# Patient Record
Sex: FEMALE | Race: WHITE | NOT HISPANIC OR LATINO | Employment: FULL TIME | URBAN - METROPOLITAN AREA
[De-identification: names, ages, dates, MRNs, and addresses within clinical notes are randomized per-mention and may not be internally consistent; named-entity substitution may affect disease eponyms.]

---

## 2023-10-16 ENCOUNTER — HOSPITAL ENCOUNTER (EMERGENCY)
Facility: CLINIC | Age: 28
Discharge: HOME | End: 2023-10-16

## 2023-11-15 ENCOUNTER — APPOINTMENT (OUTPATIENT)
Dept: CT IMAGING | Facility: CLINIC | Age: 28
End: 2023-11-15
Attending: EMERGENCY MEDICINE
Payer: COMMERCIAL

## 2023-11-15 ENCOUNTER — HOSPITAL ENCOUNTER (EMERGENCY)
Facility: CLINIC | Age: 28
Discharge: HOME | End: 2023-11-15
Attending: EMERGENCY MEDICINE | Admitting: EMERGENCY MEDICINE
Payer: COMMERCIAL

## 2023-11-15 VITALS
RESPIRATION RATE: 14 BRPM | DIASTOLIC BLOOD PRESSURE: 71 MMHG | BODY MASS INDEX: 39.99 KG/M2 | WEIGHT: 270 LBS | HEART RATE: 83 BPM | TEMPERATURE: 97.6 F | OXYGEN SATURATION: 99 % | SYSTOLIC BLOOD PRESSURE: 124 MMHG | HEIGHT: 69 IN

## 2023-11-15 DIAGNOSIS — M79.10 MYALGIA: ICD-10-CM

## 2023-11-15 DIAGNOSIS — R50.9 FEBRILE ILLNESS: ICD-10-CM

## 2023-11-15 DIAGNOSIS — R51.9 ACUTE INTRACTABLE HEADACHE, UNSPECIFIED HEADACHE TYPE: ICD-10-CM

## 2023-11-15 LAB
ALBUMIN SERPL BCG-MCNC: 4.2 G/DL (ref 3.5–5.2)
ALBUMIN UR-MCNC: 70 MG/DL
ALP SERPL-CCNC: 59 U/L (ref 40–150)
ALT SERPL W P-5'-P-CCNC: 17 U/L (ref 0–50)
ANION GAP SERPL CALCULATED.3IONS-SCNC: 15 MMOL/L (ref 7–15)
APPEARANCE CSF: CLEAR
APPEARANCE UR: CLEAR
AST SERPL W P-5'-P-CCNC: 32 U/L (ref 0–45)
BACTERIA #/AREA URNS HPF: ABNORMAL /HPF
BASOPHILS # BLD AUTO: 0 10E3/UL (ref 0–0.2)
BASOPHILS NFR BLD AUTO: 0 %
BILIRUB SERPL-MCNC: 0.3 MG/DL
BILIRUB UR QL STRIP: NEGATIVE
BUN SERPL-MCNC: 8.5 MG/DL (ref 6–20)
CALCIUM SERPL-MCNC: 8.7 MG/DL (ref 8.6–10)
CHLORIDE SERPL-SCNC: 101 MMOL/L (ref 98–107)
CK SERPL-CCNC: 60 U/L (ref 26–192)
COLOR CSF: COLORLESS
COLOR UR AUTO: YELLOW
CREAT SERPL-MCNC: 0.9 MG/DL (ref 0.51–0.95)
DEPRECATED HCO3 PLAS-SCNC: 19 MMOL/L (ref 22–29)
EGFRCR SERPLBLD CKD-EPI 2021: 88 ML/MIN/1.73M2
EOSINOPHIL # BLD AUTO: 0.2 10E3/UL (ref 0–0.7)
EOSINOPHIL NFR BLD AUTO: 6 %
ERYTHROCYTE [DISTWIDTH] IN BLOOD BY AUTOMATED COUNT: 14.6 % (ref 10–15)
FLUAV RNA SPEC QL NAA+PROBE: NEGATIVE
FLUBV RNA RESP QL NAA+PROBE: NEGATIVE
GLUCOSE CSF-MCNC: 60 MG/DL (ref 40–70)
GLUCOSE SERPL-MCNC: 90 MG/DL (ref 70–99)
GLUCOSE UR STRIP-MCNC: NEGATIVE MG/DL
GROUP A STREP BY PCR: NOT DETECTED
HCG SERPL QL: NEGATIVE
HCT VFR BLD AUTO: 42.3 % (ref 35–47)
HGB BLD-MCNC: 13.6 G/DL (ref 11.7–15.7)
HGB UR QL STRIP: NEGATIVE
IMM GRANULOCYTES # BLD: 0.1 10E3/UL
IMM GRANULOCYTES NFR BLD: 2 %
KETONES UR STRIP-MCNC: NEGATIVE MG/DL
LACTATE SERPL-SCNC: 0.8 MMOL/L (ref 0.7–2)
LEUKOCYTE ESTERASE UR QL STRIP: NEGATIVE
LYMPHOCYTES # BLD AUTO: 1.2 10E3/UL (ref 0.8–5.3)
LYMPHOCYTES NFR BLD AUTO: 36 %
MCH RBC QN AUTO: 25.6 PG (ref 26.5–33)
MCHC RBC AUTO-ENTMCNC: 32.2 G/DL (ref 31.5–36.5)
MCV RBC AUTO: 80 FL (ref 78–100)
MONOCYTES # BLD AUTO: 0.2 10E3/UL (ref 0–1.3)
MONOCYTES NFR BLD AUTO: 5 %
MUCOUS THREADS #/AREA URNS LPF: PRESENT /LPF
NEUTROPHILS # BLD AUTO: 1.6 10E3/UL (ref 1.6–8.3)
NEUTROPHILS NFR BLD AUTO: 51 %
NITRATE UR QL: NEGATIVE
NRBC # BLD AUTO: 0 10E3/UL
NRBC BLD AUTO-RTO: 0 /100
PH UR STRIP: 6 [PH] (ref 5–7)
PLATELET # BLD AUTO: 173 10E3/UL (ref 150–450)
POTASSIUM SERPL-SCNC: 3.9 MMOL/L (ref 3.4–5.3)
PROCALCITONIN SERPL IA-MCNC: 0.11 NG/ML
PROT CSF-MCNC: 24.9 MG/DL (ref 15–45)
PROT SERPL-MCNC: 7.5 G/DL (ref 6.4–8.3)
RBC # BLD AUTO: 5.31 10E6/UL (ref 3.8–5.2)
RBC # CSF MANUAL: 1 /UL (ref 0–2)
RBC URINE: 3 /HPF
RSV RNA SPEC NAA+PROBE: NEGATIVE
SARS-COV-2 RNA RESP QL NAA+PROBE: NEGATIVE
SODIUM SERPL-SCNC: 135 MMOL/L (ref 135–145)
SP GR UR STRIP: 1.05 (ref 1–1.03)
SQUAMOUS EPITHELIAL: 9 /HPF
TRANSITIONAL EPI: 1 /HPF
TUBE # CSF: 4
UROBILINOGEN UR STRIP-MCNC: NORMAL MG/DL
WBC # BLD AUTO: 3.2 10E3/UL (ref 4–11)
WBC # CSF MANUAL: 0 /UL (ref 0–5)
WBC URINE: 2 /HPF

## 2023-11-15 PROCEDURE — 250N000013 HC RX MED GY IP 250 OP 250 PS 637: Performed by: EMERGENCY MEDICINE

## 2023-11-15 PROCEDURE — 84703 CHORIONIC GONADOTROPIN ASSAY: CPT | Performed by: EMERGENCY MEDICINE

## 2023-11-15 PROCEDURE — 96374 THER/PROPH/DIAG INJ IV PUSH: CPT | Mod: 59

## 2023-11-15 PROCEDURE — 96361 HYDRATE IV INFUSION ADD-ON: CPT

## 2023-11-15 PROCEDURE — 85014 HEMATOCRIT: CPT | Performed by: EMERGENCY MEDICINE

## 2023-11-15 PROCEDURE — 62270 DX LMBR SPI PNXR: CPT

## 2023-11-15 PROCEDURE — 84145 PROCALCITONIN (PCT): CPT | Performed by: EMERGENCY MEDICINE

## 2023-11-15 PROCEDURE — 70450 CT HEAD/BRAIN W/O DYE: CPT

## 2023-11-15 PROCEDURE — 87015 SPECIMEN INFECT AGNT CONCNTJ: CPT | Performed by: EMERGENCY MEDICINE

## 2023-11-15 PROCEDURE — 82945 GLUCOSE OTHER FLUID: CPT | Performed by: EMERGENCY MEDICINE

## 2023-11-15 PROCEDURE — 250N000013 HC RX MED GY IP 250 OP 250 PS 637: Performed by: SOCIAL WORKER

## 2023-11-15 PROCEDURE — 87637 SARSCOV2&INF A&B&RSV AMP PRB: CPT | Performed by: EMERGENCY MEDICINE

## 2023-11-15 PROCEDURE — 89050 BODY FLUID CELL COUNT: CPT | Performed by: EMERGENCY MEDICINE

## 2023-11-15 PROCEDURE — 36415 COLL VENOUS BLD VENIPUNCTURE: CPT | Performed by: EMERGENCY MEDICINE

## 2023-11-15 PROCEDURE — 99285 EMERGENCY DEPT VISIT HI MDM: CPT | Mod: 25

## 2023-11-15 PROCEDURE — 87651 STREP A DNA AMP PROBE: CPT | Performed by: EMERGENCY MEDICINE

## 2023-11-15 PROCEDURE — 84157 ASSAY OF PROTEIN OTHER: CPT | Performed by: EMERGENCY MEDICINE

## 2023-11-15 PROCEDURE — 80053 COMPREHEN METABOLIC PANEL: CPT | Performed by: EMERGENCY MEDICINE

## 2023-11-15 PROCEDURE — 81003 URINALYSIS AUTO W/O SCOPE: CPT | Performed by: EMERGENCY MEDICINE

## 2023-11-15 PROCEDURE — 250N000011 HC RX IP 250 OP 636: Performed by: EMERGENCY MEDICINE

## 2023-11-15 PROCEDURE — 82550 ASSAY OF CK (CPK): CPT | Performed by: EMERGENCY MEDICINE

## 2023-11-15 PROCEDURE — 87637 SARSCOV2&INF A&B&RSV AMP PRB: CPT | Performed by: SOCIAL WORKER

## 2023-11-15 PROCEDURE — 258N000003 HC RX IP 258 OP 636: Performed by: EMERGENCY MEDICINE

## 2023-11-15 PROCEDURE — 83605 ASSAY OF LACTIC ACID: CPT | Performed by: EMERGENCY MEDICINE

## 2023-11-15 PROCEDURE — 87205 SMEAR GRAM STAIN: CPT | Performed by: EMERGENCY MEDICINE

## 2023-11-15 RX ORDER — MAGNESIUM HYDROXIDE/ALUMINUM HYDROXICE/SIMETHICONE 120; 1200; 1200 MG/30ML; MG/30ML; MG/30ML
15 SUSPENSION ORAL ONCE
Status: COMPLETED | OUTPATIENT
Start: 2023-11-15 | End: 2023-11-15

## 2023-11-15 RX ORDER — ACETAMINOPHEN 325 MG/1
975 TABLET ORAL ONCE
Status: COMPLETED | OUTPATIENT
Start: 2023-11-15 | End: 2023-11-15

## 2023-11-15 RX ORDER — MORPHINE SULFATE 4 MG/ML
4 INJECTION, SOLUTION INTRAMUSCULAR; INTRAVENOUS
Status: DISCONTINUED | OUTPATIENT
Start: 2023-11-15 | End: 2023-11-15 | Stop reason: HOSPADM

## 2023-11-15 RX ORDER — OXYCODONE HYDROCHLORIDE 5 MG/1
5 TABLET ORAL EVERY 6 HOURS PRN
Qty: 6 TABLET | Refills: 0 | Status: SHIPPED | OUTPATIENT
Start: 2023-11-15 | End: 2023-11-18

## 2023-11-15 RX ORDER — IBUPROFEN 600 MG/1
600 TABLET, FILM COATED ORAL ONCE
Status: COMPLETED | OUTPATIENT
Start: 2023-11-15 | End: 2023-11-15

## 2023-11-15 RX ORDER — PROMETHAZINE HYDROCHLORIDE 25 MG/1
25 TABLET ORAL EVERY 6 HOURS PRN
Qty: 12 TABLET | Refills: 0 | Status: SHIPPED | OUTPATIENT
Start: 2023-11-15 | End: 2024-09-24

## 2023-11-15 RX ADMIN — ALUMINUM HYDROXIDE, MAGNESIUM HYDROXIDE, AND SIMETHICONE 15 ML: 200; 200; 20 SUSPENSION ORAL at 21:14

## 2023-11-15 RX ADMIN — SODIUM CHLORIDE 1000 ML: 9 INJECTION, SOLUTION INTRAVENOUS at 15:42

## 2023-11-15 RX ADMIN — ACETAMINOPHEN 975 MG: 325 TABLET, FILM COATED ORAL at 10:40

## 2023-11-15 RX ADMIN — IBUPROFEN 600 MG: 600 TABLET ORAL at 10:40

## 2023-11-15 RX ADMIN — MORPHINE SULFATE 4 MG: 4 INJECTION, SOLUTION INTRAMUSCULAR; INTRAVENOUS at 18:25

## 2023-11-15 ASSESSMENT — ACTIVITIES OF DAILY LIVING (ADL)
ADLS_ACUITY_SCORE: 35

## 2023-11-15 NOTE — ED PROVIDER NOTES
"  History     Chief Complaint:  Flu Symptoms       The history is provided by the patient.      Bandar Bhatt is a 28 year old female who presents with with concern for headache and meningitis.  The patient reports that she has had a fever, severe headache, neck, back and flank pain that has been getting increasingly worse over the past week.  No rash.  She does not have a sore throat.  Her boyfriend is daughter tested positive for influenza B last week but she has not been around her.  No other sick contacts.  No recent travel. No history of IV drug use. She was seen by a virtual doctor this morning and told that he was concerned she had meningitis and referred to the emergency department. Patient reports she recently moved here from [place].    Independent Historian:   None - Patient Only    Review of External Notes:   None    Medications:    No current medications on file.    Past Medical History:    No past medical history on file.    Physical Exam   Patient Vitals for the past 24 hrs:   BP Temp Temp src Pulse Resp SpO2 Height Weight   11/15/23 1935 124/71 -- -- 83 14 99 % -- --   11/15/23 1850 110/59 -- -- 78 -- 99 % -- --   11/15/23 1827 -- -- -- -- -- 97 % -- --   11/15/23 1503 119/66 97.6  F (36.4  C) Temporal 86 18 99 % -- --   11/15/23 1035 135/70 (!) 100.7  F (38.2  C) Oral 107 18 99 % 1.753 m (5' 9\") 122.5 kg (270 lb)     Physical Exam  General: Well-nourished, appears to be in pain  Eyes: PERRL, conjunctivae pink no scleral icterus or conjunctival injection  ENT:  Moist mucus membranes, posterior oropharynx clear without erythema or exudates  Respiratory:  Lungs clear to auscultation bilaterally, no crackles/rubs/wheezes.  Good air movement  CV: Normal rate and rhythm, no murmurs/rubs/gallops  GI:  Abdomen soft and non-distended.  Normoactive BS.  No tenderness, guarding or rebound  Skin: Warm, dry.  No rashes or petechiae  Musculoskeletal: No peripheral edema or calf tenderness  Neuro: Alert and " oriented to person/place/time. Neck seems somewhat stiff. PERRL, EOMI no nystagmus, no aphasia/facial droop/dysarthria, tongue midline, symmetric palatal elevation, normal strength at SCM/trapezius/BUE/BLE, normal coordination to FNF at BUE, normal casual gait, negative romberg, sensation intact to LT over face/BUE/BLE  Psychiatric: Normal affect    Emergency Department Course   Imaging:  CT Head w/o Contrast   Final Result   IMPRESSION:  Normal head CT.      Radiation dose for this scan was reduced using automated exposure   control, adjustment of the mA and/or kV according to patient size, or   iterative reconstruction technique.        KENY PAUL MD            SYSTEM ID:  [id number]        Laboratory:  Labs Ordered and Resulted from Time of ED Arrival to Time of ED Departure   COMPREHENSIVE METABOLIC PANEL - Abnormal       Result Value    Sodium 135      Potassium 3.9      Carbon Dioxide (CO2) 19 (*)     Anion Gap 15      Urea Nitrogen 8.5      Creatinine 0.90      GFR Estimate 88      Calcium 8.7      Chloride 101      Glucose 90      Alkaline Phosphatase 59      AST 32      ALT 17      Protein Total 7.5      Albumin 4.2      Bilirubin Total 0.3     ROUTINE UA WITH MICROSCOPIC REFLEX TO CULTURE - Abnormal    Color Urine Yellow      Appearance Urine Clear      Glucose Urine Negative      Bilirubin Urine Negative      Ketones Urine Negative      Specific Gravity Urine 1.049 (*)     Blood Urine Negative      pH Urine 6.0      Protein Albumin Urine 70 (*)     Urobilinogen Urine Normal      Nitrite Urine Negative      Leukocyte Esterase Urine Negative      Bacteria Urine Few (*)     Mucus Urine Present (*)     RBC Urine 3 (*)     WBC Urine 2      Squamous Epithelials Urine 9 (*)     Transitional Epithelials Urine 1     CBC WITH PLATELETS AND DIFFERENTIAL - Abnormal    WBC Count 3.2 (*)     RBC Count 5.31 (*)     Hemoglobin 13.6      Hematocrit 42.3      MCV 80      MCH 25.6 (*)     MCHC 32.2      RDW 14.6       Platelet Count 173      % Neutrophils 51      % Lymphocytes 36      % Monocytes 5      % Eosinophils 6      % Basophils 0      % Immature Granulocytes 2      NRBCs per 100 WBC 0      Absolute Neutrophils 1.6      Absolute Lymphocytes 1.2      Absolute Monocytes 0.2      Absolute Eosinophils 0.2      Absolute Basophils 0.0      Absolute Immature Granulocytes 0.1      Absolute NRBCs 0.0     INFLUENZA A/B, RSV, & SARS-COV2 PCR - Normal    Influenza A PCR Negative      Influenza B PCR Negative      RSV PCR Negative      SARS CoV2 PCR Negative     LACTIC ACID WHOLE BLOOD - Normal    Lactic Acid 0.8     PROCALCITONIN - Normal    Procalcitonin 0.11     HCG QUALITATIVE PREGNANCY - Normal    hCG Serum Qualitative Negative     CK TOTAL - Normal    CK 60     GLUCOSE CSF - Normal    Glucose CSF 60     PROTEIN TOTAL CSF - Normal    Protein total CSF 24.9     GROUP A STREPTOCOCCUS PCR THROAT SWAB - Normal    Group A strep by PCR Not Detected     CELL COUNT CSF    Tube Number 4      Color Colorless      Clarity Clear      Total Nucleated Cells 0      RBC Count 1     CELL COUNT WITH DIFFERENTIAL CSF     Emergency Department Course & Assessments:     Interventions:  Medications   acetaminophen (TYLENOL) tablet 975 mg (975 mg Oral $Given 11/15/23 1040)   ibuprofen (ADVIL/MOTRIN) tablet 600 mg (600 mg Oral $Given 11/15/23 1040)   sodium chloride 0.9% BOLUS 1,000 mL (0 mLs Intravenous Stopped 11/15/23 1938)   famotidine (PEPCID) injection 20 mg (20 mg Intravenous Not Given 11/15/23 1939)   alum & mag hydroxide-simethicone (MAALOX) suspension 15 mL (15 mLs Oral Not Given 11/15/23 1938)   alum & mag hydroxide-simethicone (MAALOX) suspension 15 mL (15 mLs Oral $Given 11/15/23 2114)     Procedures:    Lumbar Puncture      Procedure: Lumbar Puncture      Indication: headache and fever     Consent: Written from Patient  Risks Discussed (including but not limited to): Infection, Bleeding, Spinal headache with possibility of spinal patch, and  Temporary or permanent neurological injury     Universal Protocol: Universal protocol was followed and time out conducted just prior to starting procedure, confirming patient identity, site/side, procedure, patient position, and availability of correct equipment and implants.     Anesthesia/Sedation: Lidocaine - 1%     Procedure Note:     Patient was placed in a sitting position.  The skin overlying the L3-4 area was prepped with povidone-iodine.    The patient was medicated as above.   A 21 gauge spinal needle was used to gain access to the subarachnoid space with stylet in place.   Opening pressure was not measured.   The fluid was initially bloody but clearing.   Stylet was replaced and needle withdrawn.      Patient Status:  The patient tolerated the procedure well: Yes. There were no complications.    Independent Interpretation (X-rays, CTs, rhythm strip):  I reviewed and interpreted the head CT and see no evidence of intracranial hemorrhage.    Consultations/Discussion of Management or Tests:    ED Course as of 11/16/23 0034   Wed Nov 15, 2023   1718 I rechecked the patient and explained findings.         Social Determinants of Health affecting care:   None    Disposition:  The patient was discharged to home.     Impression & Plan    CMS Diagnoses: None    Medical Decision Making:  Bandar Bhatt is a 28 year old female who comes with headache, fever, myalgias and some concerning neck stiffness on exam. She is fairly well appearing. No rash to suggest bacterial meningitis. Viral testing for COVID, influenza and RSV were negative. Strep testing is negative. She does not have abdominal pain to suggest an intrabdominal source. Lungs are clear and she does not have significant respiratory symptoms. Labs were otherwise reassuring, however, given her fever, headache and neck symptoms, lumbar puncture was undertaken for concern of meningitis. Fortunately, LP shows no signs of meningitis. Etiology of fever and  symptoms are unknown but likely viral syndrome. Patient will be discharged with pain meds and antiemetics. A referral for a PMD was made. No indication for antibiotics at this time. Patient felt comfortable with the plan for discharge home.      Diagnosis:    ICD-10-CM    1. Febrile illness  R50.9 Primary Care Referral      2. Acute intractable headache, unspecified headache type  R51.9 Primary Care Referral      3. Myalgia  M79.10 Primary Care Referral           Discharge Medications:  Discharge Medication List as of 11/15/2023  9:01 PM        START taking these medications    Details   oxyCODONE (ROXICODONE) 5 MG tablet Take 1 tablet (5 mg) by mouth every 6 hours as needed for severe pain, Disp-6 tablet, R-0, E-Prescribe      promethazine (PHENERGAN) 25 MG tablet Take 1 tablet (25 mg) by mouth every 6 hours as needed for nausea, Disp-12 tablet, R-0, E-Prescribe           Christine LAINEZ. MD Juan  11/15/2023   Christine Martinez MD Cho, Amy C, MD  11/16/23 0038

## 2023-11-15 NOTE — ED TRIAGE NOTES
Patient presents with complaints of headache, generalized body aches, and nausea for the the past week. Patient was told that she needed to be tested for meningitis.      Triage Assessment (Adult)       Row Name 11/15/23 1034          Triage Assessment    Airway WDL WDL        Respiratory WDL    Respiratory WDL WDL        Skin Circulation/Temperature WDL    Skin Circulation/Temperature WDL WDL        Cardiac WDL    Cardiac WDL WDL        Peripheral/Neurovascular WDL    Peripheral Neurovascular WDL WDL        Cognitive/Neuro/Behavioral WDL    Cognitive/Neuro/Behavioral WDL WDL

## 2023-11-15 NOTE — ED NOTES
PIT/Triage Evaluation    Patient presented with concern for headache and meningitis.  The patient reports that she has had a fever, severe headache, neck, back and flank pain that has been getting increasingly worse over the past week.  No rash.  She does not have a sore throat.  Her boyfriend is daughter tested positive for influenza B last week but she has not been around her.  No other sick contacts.  No recent travel.  She was seen by a virtual doctor this morning and told that he was concerned she had meningitis and referred to the emergency department.    Exam is notable for:  Mildly stiff neck. Otherwise well appearing.     Appropriate interventions for symptom management were initiated if applicable.  Appropriate diagnostic tests were initiated if indicated.    Important information for subsequent clinician:  Negative viral testing for influenza, RSV and COVID.  Discussed her examination and that lumbar puncture is only way to rule out meningitis.  I think she is likely low risk for this but exam is concerning as well as history.  Ordered laboratory studies and head CT as well as IV fluids.  She was willing to stay.    I briefly evaluated the patient and developed an initial plan of care. I discussed this plan and explained that this brief interaction does not constitute a full evaluation. Patient/family understands that they should wait to be fully evaluated and discuss any test results with another clinician prior to leaving the hospital.       Christine Martinez MD  11/16/23 0029

## 2023-11-16 NOTE — DISCHARGE INSTRUCTIONS
*You may resume diet and activities as tolerated.  *Ibuprofen and/or Tylenol as directed as needed for pain.  Oxycodone for severe pain not relieved by ibuprofen or Tylenol.  Phenergan as directed as needed for nausea and vomiting.  *Follow-up with your primary care doctor in the next few days.  I made a referral to help you establish care.  You should receive a call to schedule your appointment.  *Return if you develop intractable vomiting, numbness, weakness problems with speech or vision, faint or feel like you will faint or become worse in any way.    Discharge Instructions  Headache    You were seen today for a headache. Headaches may be caused by many different things such as muscle tension, sinus inflammation, anxiety and stress, having too little sleep, too much alcohol, some medical conditions or injury. You may have a migraine, which is caused by changes in the blood vessels in your head.  At this time your provider does not find that your headache is a sign of anything dangerous or life-threatening.  However, sometimes the signs of serious illness do not show up right away.      Generally, every Emergency Department visit should have a follow-up clinic visit with either a primary or a specialty clinic/provider. Please follow-up as instructed by your emergency provider today.    Return to the Emergency Department if:  You get a new fever of 100.4 F or higher.  Your headache gets much worse.  You get a stiff neck with your headache.  You get a new headache that is significantly different or worse than headaches you have had before.  You are vomiting (throwing up) and cannot keep food or water down.  You have blurry or double vision or other problems with your eyes.  You have a new weakness on one side of your body.  You have difficulty with balance which is new.  You or your family thinks you are confused.  You have a seizure.    What can I do to help myself?  Pain medications - You may take a pain  medication such as Tylenol  (acetaminophen), Advil , Motrin  (ibuprofen) or Aleve  (naproxen).  Take a pain reliever as soon as you notice symptoms.  Starting medications as soon as you start to have symptoms may lessen the amount of pain you have.  Relaxing in a quiet, dark room may help.  Get enough sleep and eat meals regularly.  You may need to watch for certain foods or other things which may trigger your headaches.  Keeping a journal of your headaches and possible triggers may help you and your primary provider to identify things which you should avoid which may be causing your headaches.  If you were given a prescription for medicine here today, be sure to read all of the information (including the package insert) that comes with your prescription.  This will include important information about the medicine, its side effects, and any warnings that you need to know about.  The pharmacist who fills the prescription can provide more information and answer questions you may have about the medicine.  If you have questions or concerns that the pharmacist cannot address, please call or return to the Emergency Department.   Remember that you can always come back to the Emergency Department if you are not able to see your regular provider in the amount of time listed above, if you get any new symptoms, or if there is anything that worries you.

## 2023-11-20 LAB
BACTERIA CSF CULT: NO GROWTH
GRAM STAIN RESULT: NORMAL
GRAM STAIN RESULT: NORMAL

## 2024-02-16 ENCOUNTER — HOSPITAL ENCOUNTER (EMERGENCY)
Facility: CLINIC | Age: 29
Discharge: HOME | End: 2024-02-17
Attending: EMERGENCY MEDICINE | Admitting: EMERGENCY MEDICINE
Payer: COMMERCIAL

## 2024-02-16 DIAGNOSIS — L50.9 URTICARIA: ICD-10-CM

## 2024-02-16 DIAGNOSIS — T78.40XA ALLERGIC REACTION, INITIAL ENCOUNTER: ICD-10-CM

## 2024-02-16 PROCEDURE — 99283 EMERGENCY DEPT VISIT LOW MDM: CPT

## 2024-02-16 RX ORDER — PRAZOSIN HYDROCHLORIDE 5 MG/1
5 CAPSULE ORAL
COMMUNITY
Start: 2024-02-09 | End: 2024-09-24

## 2024-02-16 RX ORDER — ESCITALOPRAM OXALATE 20 MG/1
20 TABLET ORAL EVERY MORNING
COMMUNITY
Start: 2023-12-21 | End: 2024-09-24

## 2024-02-16 RX ORDER — QUETIAPINE FUMARATE 100 MG/1
TABLET, FILM COATED ORAL
COMMUNITY
Start: 2024-02-09 | End: 2024-09-24

## 2024-02-16 RX ORDER — LAMOTRIGINE 25 MG/1
100 TABLET ORAL
COMMUNITY
Start: 2024-01-29

## 2024-02-17 VITALS
SYSTOLIC BLOOD PRESSURE: 138 MMHG | OXYGEN SATURATION: 99 % | RESPIRATION RATE: 18 BRPM | BODY MASS INDEX: 41.47 KG/M2 | HEIGHT: 69 IN | DIASTOLIC BLOOD PRESSURE: 94 MMHG | TEMPERATURE: 97.7 F | WEIGHT: 280 LBS | HEART RATE: 90 BPM

## 2024-02-17 PROCEDURE — 250N000012 HC RX MED GY IP 250 OP 636 PS 637: Performed by: EMERGENCY MEDICINE

## 2024-02-17 PROCEDURE — 250N000013 HC RX MED GY IP 250 OP 250 PS 637: Performed by: EMERGENCY MEDICINE

## 2024-02-17 RX ORDER — PREDNISONE 20 MG/1
40 TABLET ORAL DAILY
Qty: 8 TABLET | Refills: 0 | Status: SHIPPED | OUTPATIENT
Start: 2024-02-17 | End: 2024-02-21

## 2024-02-17 RX ORDER — FAMOTIDINE 20 MG/1
20 TABLET, FILM COATED ORAL ONCE
Status: COMPLETED | OUTPATIENT
Start: 2024-02-17 | End: 2024-02-17

## 2024-02-17 RX ORDER — PREDNISONE 20 MG/1
40 TABLET ORAL ONCE
Status: COMPLETED | OUTPATIENT
Start: 2024-02-17 | End: 2024-02-17

## 2024-02-17 RX ADMIN — FAMOTIDINE 20 MG: 20 TABLET ORAL at 00:32

## 2024-02-17 RX ADMIN — PREDNISONE 40 MG: 20 TABLET ORAL at 00:32

## 2024-02-17 ASSESSMENT — ACTIVITIES OF DAILY LIVING (ADL): ADLS_ACUITY_SCORE: 35

## 2024-02-17 NOTE — ED TRIAGE NOTES
Patient here  with a rash which started 3 weeks ago. She stated the rash started after she was put on lamictal      Triage Assessment (Adult)       Row Name 02/16/24 9553          Triage Assessment    Airway WDL WDL        Respiratory WDL    Respiratory WDL WDL        Skin Circulation/Temperature WDL    Skin Circulation/Temperature WDL WDL        Cardiac WDL    Cardiac WDL WDL        Peripheral/Neurovascular WDL    Peripheral Neurovascular WDL WDL        Cognitive/Neuro/Behavioral WDL    Cognitive/Neuro/Behavioral WDL WDL

## 2024-02-17 NOTE — DISCHARGE INSTRUCTIONS
Please continue with your allergy medication as directed on the box and follow up with your psychiatrist.

## 2024-02-17 NOTE — ED PROVIDER NOTES
"  History     Chief Complaint:  Rash       HPI   Bandar Bhatt is a 28 year old female who presents with rash. 3 weeks ago the patient started Lamotrigine and began to develop intermittent hives, itching and red rash. She states that the hives gradually faded but still had intermittent rashes, then she increased her dosage of Lamotrigine and developed hives on her neck yesterday morning. Took allergy medication around 1400 yesterday. She also endorses low back pain that is worse in the mornings. She denies any fever, vomiting, appetite change.       Independent Historian:   None - Patient Only      Medications:    Lexapro   Lamictal   Minipress   Seroquel   Phenergan     Past Medical History:    Anxiety   Depression   Mood problems     Physical Exam   Patient Vitals for the past 24 hrs:   BP Temp Temp src Pulse Resp SpO2 Height Weight   02/17/24 0130 -- -- -- -- -- 99 % -- --   02/17/24 0115 (!) 138/94 -- -- -- 18 98 % -- --   02/17/24 0030 -- -- -- -- -- 97 % -- --   02/17/24 0015 -- -- -- -- -- 97 % -- --   02/17/24 0000 -- -- -- -- -- 97 % -- --   02/16/24 2345 -- -- -- -- -- 96 % -- --   02/16/24 2315 133/82 97.7  F (36.5  C) Oral 90 20 98 % 1.753 m (5' 9\") 127 kg (280 lb)        Physical Exam  General: Appears well-developed and well-nourished.   Head: No signs of trauma.   Mouth/Throat: Oropharynx is clear and moist. No swelling noted  Eyes: Conjunctivae are normal.   Neck: Normal range of motion. No nuchal rigidity. No cervical adenopathy  CV: Normal rate and regular rhythm.    Resp: Effort normal and breath sounds normal. No respiratory distress.   GI: Soft. There is no tenderness.  No rebound or guarding.  Normal bowel sounds.    MSK: Normal range of motion. no edema. No Calf tenderness.  Neuro: The patient is alert and oriented.  Strength in lower extremities normal and symmetrical. Normal dorsi/plantar flexion of ankles/great toes.  Sensation normal including saddle region. Speech normal.  " Ambulatory.   Skin: Skin is warm and dry. Urticarial type rash across anterior neck  Psych: normal mood and affect. behavior is normal.       Emergency Department Course     Emergency Department Course & Assessments:       Interventions:  Medications   predniSONE (DELTASONE) tablet 40 mg (40 mg Oral $Given 2/17/24 0032)   famotidine (PEPCID) tablet 20 mg (20 mg Oral $Given 2/17/24 0032)      Assessments:  0020 I examined the patient and obtained history as shown above  0147 I rechecked the patient     Independent Interpretation (X-rays, CTs, rhythm strip):  None    Consultations/Discussion of Management or Tests:  None        Social Determinants of Health affecting care:   None    Disposition:  The patient was discharged.     Impression & Plan    CMS Diagnoses: None    Medical Decision Making:  Bandar Bhatt presents with an urticarial type, pruritic rash primarily to the neck.  She states that she had first had hives that were migratory when she started lamotrigine a few weeks ago.  They gradually improved, but she then increase the dose at her doctor'srRecommendation, and the urticaria and itching once again increased.  When she had realized the correlation, she had spoken with her psychiatrist, who recommended she come to the emergency department.  On evaluation, she did have some urticaria to the neck, but there is no clear signs of anaphylaxis.  She had already taken an antihistamine at home and she was given prednisone and Pepcid here and was monitored.  She did have improvement of her symptoms while in the ER, and was requesting to go home, which I felt was appropriate.  Her psychiatrist had already recommended that she stop the lamotrigine I recommended she continue with the prednisone for a few more days.  She does have a follow-up appoint with a psychiatrist in the morning to discuss medication adjustments.  I did discuss return precautions.      Diagnosis:    ICD-10-CM    1. Urticaria  L50.9 Primary  Care Referral      2. Allergic reaction, initial encounter  T78.40XA Primary Care Referral           Discharge Medications:  Discharge Medication List as of 2/17/2024  1:53 AM        START taking these medications    Details   predniSONE (DELTASONE) 20 MG tablet Take 2 tablets (40 mg) by mouth daily for 4 days, Disp-8 tablet, R-0, E-Prescribe            Scribe Disclosure:  I, Carlton Araujo, am serving as a scribe at 12:17 AM on 2/17/2024 to document services personally performed by Sonido Hunter MD based on my observations and the provider's statements to me.     2/17/2024   Sonido Hunter MD Bergenstal, John A, MD  02/17/24 0408

## 2024-05-26 ENCOUNTER — APPOINTMENT (OUTPATIENT)
Dept: GENERAL RADIOLOGY | Facility: CLINIC | Age: 29
End: 2024-05-26
Attending: EMERGENCY MEDICINE
Payer: COMMERCIAL

## 2024-05-26 ENCOUNTER — HOSPITAL ENCOUNTER (EMERGENCY)
Facility: CLINIC | Age: 29
Discharge: HOME | End: 2024-05-26
Attending: EMERGENCY MEDICINE | Admitting: EMERGENCY MEDICINE
Payer: COMMERCIAL

## 2024-05-26 ENCOUNTER — APPOINTMENT (OUTPATIENT)
Dept: CT IMAGING | Facility: CLINIC | Age: 29
End: 2024-05-26
Attending: EMERGENCY MEDICINE
Payer: COMMERCIAL

## 2024-05-26 VITALS
SYSTOLIC BLOOD PRESSURE: 125 MMHG | TEMPERATURE: 98.8 F | DIASTOLIC BLOOD PRESSURE: 78 MMHG | RESPIRATION RATE: 16 BRPM | HEART RATE: 89 BPM | OXYGEN SATURATION: 95 %
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
## 2024-05-26 DIAGNOSIS — M25.562 ACUTE PAIN OF LEFT KNEE: ICD-10-CM

## 2024-05-26 DIAGNOSIS — S20.212A RIB CONTUSION, LEFT, INITIAL ENCOUNTER: ICD-10-CM

## 2024-05-26 PROCEDURE — 71101 X-RAY EXAM UNILAT RIBS/CHEST: CPT | Mod: LT

## 2024-05-26 PROCEDURE — 250N000013 HC RX MED GY IP 250 OP 250 PS 637: Performed by: EMERGENCY MEDICINE

## 2024-05-26 PROCEDURE — 73502 X-RAY EXAM HIP UNI 2-3 VIEWS: CPT

## 2024-05-26 PROCEDURE — 70486 CT MAXILLOFACIAL W/O DYE: CPT

## 2024-05-26 PROCEDURE — 73560 X-RAY EXAM OF KNEE 1 OR 2: CPT | Mod: LT

## 2024-05-26 PROCEDURE — 73000 X-RAY EXAM OF COLLAR BONE: CPT | Mod: LT

## 2024-05-26 PROCEDURE — 99285 EMERGENCY DEPT VISIT HI MDM: CPT | Mod: 25

## 2024-05-26 RX ORDER — OXYCODONE HYDROCHLORIDE 5 MG/1
5 TABLET ORAL EVERY 6 HOURS PRN
Qty: 12 TABLET | Refills: 0 | Status: SHIPPED | OUTPATIENT
Start: 2024-05-26 | End: 2024-05-29

## 2024-05-26 RX ORDER — LIDOCAINE 4 G/G
1 PATCH TOPICAL EVERY 24 HOURS
Qty: 5 PATCH | Refills: 0 | Status: SHIPPED | OUTPATIENT
Start: 2024-05-26

## 2024-05-26 RX ORDER — IBUPROFEN 600 MG/1
600 TABLET, FILM COATED ORAL EVERY 6 HOURS PRN
Qty: 30 TABLET | Refills: 1 | Status: SHIPPED | OUTPATIENT
Start: 2024-05-26

## 2024-05-26 RX ORDER — HYDROCODONE BITARTRATE AND ACETAMINOPHEN 5; 325 MG/1; MG/1
1 TABLET ORAL ONCE
Status: COMPLETED | OUTPATIENT
Start: 2024-05-26 | End: 2024-05-26

## 2024-05-26 RX ORDER — OXYCODONE HYDROCHLORIDE 5 MG/1
5 TABLET ORAL ONCE
Status: COMPLETED | OUTPATIENT
Start: 2024-05-26 | End: 2024-05-26

## 2024-05-26 RX ORDER — IBUPROFEN 600 MG/1
600 TABLET, FILM COATED ORAL ONCE
Status: COMPLETED | OUTPATIENT
Start: 2024-05-26 | End: 2024-05-26

## 2024-05-26 RX ADMIN — HYDROCODONE BITARTRATE AND ACETAMINOPHEN 1 TABLET: 5; 325 TABLET ORAL at 20:19

## 2024-05-26 RX ADMIN — IBUPROFEN 600 MG: 600 TABLET ORAL at 20:19

## 2024-05-26 RX ADMIN — OXYCODONE HYDROCHLORIDE 5 MG: 5 TABLET ORAL at 21:32

## 2024-05-26 ASSESSMENT — ACTIVITIES OF DAILY LIVING (ADL)
ADLS_ACUITY_SCORE: 35
ADLS_ACUITY_SCORE: 35
ADLS_ACUITY_SCORE: 33
ADLS_ACUITY_SCORE: 35
ADLS_ACUITY_SCORE: 33

## 2024-05-26 ASSESSMENT — COLUMBIA-SUICIDE SEVERITY RATING SCALE - C-SSRS
1. IN THE PAST MONTH, HAVE YOU WISHED YOU WERE DEAD OR WISHED YOU COULD GO TO SLEEP AND NOT WAKE UP?: NO
6. HAVE YOU EVER DONE ANYTHING, STARTED TO DO ANYTHING, OR PREPARED TO DO ANYTHING TO END YOUR LIFE?: NO
2. HAVE YOU ACTUALLY HAD ANY THOUGHTS OF KILLING YOURSELF IN THE PAST MONTH?: NO

## 2024-05-26 NOTE — ED TRIAGE NOTES
Pt fell down steps last night, pain to lt knee, lt arm and lt side of face     Triage Assessment (Adult)       Row Name 05/26/24 9113          Triage Assessment    Airway WDL WDL        Respiratory WDL    Respiratory WDL WDL        Cardiac WDL    Cardiac WDL WDL        Cognitive/Neuro/Behavioral WDL    Cognitive/Neuro/Behavioral WDL WDL

## 2024-05-26 NOTE — LETTER
May 28, 2024      To Whom It May Concern:      Bandar Bhatt was seen in our Emergency Department today, 05/28/24.  I expect her condition to improve over the next few days.  She may return to work when improved.    Sincerely,        Jackie Gillis RN

## 2024-05-27 NOTE — DISCHARGE INSTRUCTIONS
Use ice to the knee use knee immobilizer when up and walking.  Use pain medication if necessary.  Please follow-up with orthopedics as your knee x-rays do suggest a small effusion further evaluations of traumatic effusions can be performed via orthopedics if necessary.  Thanks for your patience tonight.

## 2024-05-27 NOTE — ED PROVIDER NOTES
Emergency Department Note      History of Present Illness     Chief Complaint  Fall    HPI  Bandar Bhatt is a 29 year old female who presents with left knee left hip and facial pain after fall.  Patient is a 29-year-old female who is otherwise healthy.  Patient states she was drinking last night and fell down 8 stairs.  This morning she has had worsening pain in her left knee she has had difficulty bearing weight she feels sore in her left shoulder and arm.  She also hit her head.  She has had no vomiting.  She has had some pain in the left cheek.  Presents the emergency with boyfriend for assessment.  No clear history of loss of consciousness.  No anticoagulant use.  Did not take medication for pain prior to arrival.    Independent Historian  None    Review of External Notes  None  Past Medical History   Medical History and Problem List  No past medical history on file.    Medications  escitalopram (LEXAPRO) 20 MG tablet  lamoTRIgine (LAMICTAL) 25 MG tablet  prazosin (MINIPRESS) 5 MG capsule  promethazine (PHENERGAN) 25 MG tablet  QUEtiapine (SEROQUEL) 100 MG tablet        Surgical History   No past surgical history on file.  Physical Exam   Patient Vitals for the past 24 hrs:   BP Temp Temp src Pulse Resp SpO2   05/26/24 1856 125/78 98.8  F (37.1  C) Temporal 89 16 97 %     Physical Exam  Constitutional:       Appearance: She is obese.   HENT:      Head: Normocephalic.      Comments: Tenderness over the left lateral cheek as well as left upper mandible in the region of the ramus.  No open wound in the mouth     Right Ear: Tympanic membrane normal.      Left Ear: Tympanic membrane normal.      Nose: Nose normal.      Mouth/Throat:      Mouth: Mucous membranes are moist.   Eyes:      Pupils: Pupils are equal, round, and reactive to light.   Cardiovascular:      Rate and Rhythm: Normal rate and regular rhythm.   Pulmonary:      Effort: Pulmonary effort is normal.   Abdominal:      General: Abdomen is flat.  Bowel sounds are normal.      Comments: Tenderness is at the costophrenic angle and midclavicular line.  No left upper quadrant tenderness   Musculoskeletal:      Cervical back: Normal range of motion. No tenderness.      Comments: Left hip is tender to touch no shortening or rotation.  Patient's left knee is tender with even slight palpation.  Small effusion palpated   Skin:     General: Skin is warm.      Capillary Refill: Capillary refill takes less than 2 seconds.   Neurological:      General: No focal deficit present.      Mental Status: She is alert and oriented to person, place, and time.   Psychiatric:         Mood and Affect: Mood normal.         Diagnostics   Lab Results   Labs Ordered and Resulted from Time of ED Arrival to Time of ED Departure - No data to display    Imaging  Ribs XR, unilat 3 views + PA chest,  left   Final Result   IMPRESSION: There is no evidence of an acute displaced left-sided rib fracture with attention to the left rib cage and a skin marker.      No evidence of pneumonia or pulmonary edema. Cardiomediastinal silhouette and pulmonary vasculature are within normal limits. No pneumothorax or pleural effusion.       XR Knee Left 1/2 Views   Final Result   IMPRESSION: There is a small left knee joint effusion. There is no evidence of an acute fracture. Joint spaces are maintained.      XR Pelvis w Hip Left 1 View   Final Result   IMPRESSION: Left hip joint space is maintained. No acute fracture or dislocation. Intrapelvic IUD.      Clavicle XR, left   Final Result   Addendum (preliminary) 1 of 1   There is a transcription error in the original report. The report should    read:      IMPRESSION: There is no radiographic evidence of an acute displaced left    clavicular fracture. Normal alignment of the AC joint. No joint space    narrowing.      Final   IMPRESSION: There is no radiographic evidence of an acute displaced left sacral fracture. Normal alignment of the AC joint. No joint  space narrowing.      CT Facial Bones without Contrast   Final Result   IMPRESSION:    1.  No facial bone fracture or malalignment.                 Independent Interpretation  None  ED Course    Medications Administered  Medications   ibuprofen (ADVIL/MOTRIN) tablet 600 mg (has no administration in time range)   HYDROcodone-acetaminophen (NORCO) 5-325 MG per tablet 1 tablet (has no administration in time range)       Procedures  Procedures     Discussion of Management  None    Social Determinants of Health adding to complexity of care  None    ED Course     Medical Decision Making / Diagnosis   CMS Diagnoses:   and None    MIPS     None    [facility]  Bandar Bhatt is a 29 year old female patient presents with fall downstairs last night.  Reports emergency room after 12-24 do not recommend head imaging or CT scans due to young age.  Pain is severe with the knee x-rays negative for fracture there is an effusion hard to rule out ligamentous injury due to mechanism.  Injury of the rib cage negative for fracture.  No concerns for splenic injury on examination.  Offered reassurance pain control and follow-up with orthopedics due to traumatic effusions and ongoing severe pain    Disposition  The patient was discharged.     ICD-10 Codes:    ICD-10-CM    1. Fall down stairs, initial encounter  W10.8XXA       2. Acute pain of left knee  M25.562       3. Rib contusion, left, initial encounter  S20.212A            Discharge Medications  Discharge Medication List as of 5/26/2024 10:42 PM        START taking these medications    Details   ibuprofen (ADVIL/MOTRIN) 600 MG tablet Take 1 tablet (600 mg) by mouth every 6 hours as needed for moderate pain, Disp-30 tablet, R-1, Local Print      Lidocaine (LIDOCARE) 4 % Patch Place 1 patch onto the skin every 24 hours To prevent lidocaine toxicity, patient should be patch free for 12 hrs daily.Disp-5 patch, R-0Local Print      oxyCODONE (ROXICODONE) 5 MG tablet Take 1 tablet (5 mg) by  mouth every 6 hours as needed for pain, Disp-12 tablet, R-0, Local Print               MD Carolyn Díaz Brian Samuel, MD  05/27/24 [id number]

## 2024-05-28 NOTE — ED NOTES
5/28/24  2:18 PM     Chart accessed due to patient calling and requesting a work note be sent to her email for visit on 5/26/24.  Work note written and sent to patient's email address she provided.

## 2024-06-05 ENCOUNTER — DOCUMENTATION ONLY (OUTPATIENT)
Dept: EMERGENCY MEDICINE | Facility: CLINIC | Age: 29
End: 2024-06-05
Payer: COMMERCIAL

## 2024-06-05 DIAGNOSIS — S83.8X2A SPRAIN OF OTHER SPECIFIED PARTS OF LEFT KNEE, INITIAL ENCOUNTER: Primary | ICD-10-CM

## 2024-06-11 ENCOUNTER — DOCUMENTATION ONLY (OUTPATIENT)
Dept: EMERGENCY MEDICINE | Facility: CLINIC | Age: 29
End: 2024-06-11
Payer: COMMERCIAL

## 2024-06-11 DIAGNOSIS — S83.8X2A SPRAIN OF OTHER SPECIFIED PARTS OF LEFT KNEE, INITIAL ENCOUNTER: Primary | ICD-10-CM

## 2024-07-26 ENCOUNTER — OFFICE VISIT (OUTPATIENT)
Dept: URGENT CARE | Facility: URGENT CARE | Age: 29
End: 2024-07-26
Payer: COMMERCIAL

## 2024-07-26 VITALS
OXYGEN SATURATION: 98 % | TEMPERATURE: 98.1 F | WEIGHT: 293 LBS | DIASTOLIC BLOOD PRESSURE: 77 MMHG | BODY MASS INDEX: 46.22 KG/M2 | SYSTOLIC BLOOD PRESSURE: 120 MMHG | RESPIRATION RATE: 18 BRPM | HEART RATE: 72 BPM

## 2024-07-26 DIAGNOSIS — R07.0 THROAT PAIN: Primary | ICD-10-CM

## 2024-07-26 DIAGNOSIS — K08.89 PAIN IN TOOTH: ICD-10-CM

## 2024-07-26 LAB
DEPRECATED S PYO AG THROAT QL EIA: NEGATIVE
GROUP A STREP BY PCR: NOT DETECTED

## 2024-07-26 PROCEDURE — 99203 OFFICE O/P NEW LOW 30 MIN: CPT | Performed by: FAMILY MEDICINE

## 2024-07-26 PROCEDURE — 87651 STREP A DNA AMP PROBE: CPT | Performed by: FAMILY MEDICINE

## 2024-07-26 RX ORDER — FLUTICASONE PROPIONATE 50 MCG
1 SPRAY, SUSPENSION (ML) NASAL PRN
COMMUNITY
Start: 2024-07-18

## 2024-07-26 RX ORDER — OLANZAPINE 2.5 MG/1
1 TABLET, FILM COATED ORAL
COMMUNITY
Start: 2024-07-19 | End: 2024-09-24

## 2024-07-26 RX ORDER — CETIRIZINE HYDROCHLORIDE 10 MG/1
10 TABLET ORAL DAILY
COMMUNITY
Start: 2024-07-18

## 2024-07-26 RX ORDER — PROPRANOLOL HYDROCHLORIDE 60 MG/1
60 TABLET ORAL DAILY
COMMUNITY
Start: 2024-05-07

## 2024-07-26 RX ORDER — PAROXETINE 20 MG/1
20 TABLET, FILM COATED ORAL DAILY
COMMUNITY
Start: 2024-05-07

## 2024-07-26 NOTE — PROGRESS NOTES
Chief Complaint   Patient presents with    Pharyngitis     Sore throat since yesterday. Patient also complain of right upper molar tooth pain for the last week. States the right side of her face is painful. Possible tooth infection.        Bandar was seen today for pharyngitis.    Diagnoses and all orders for this visit:    Throat pain  -     Streptococcus A Rapid Screen w/Reflex to PCR - Clinic Collect  -     Group A Streptococcus PCR Throat Swab    Pain in tooth      Results for orders placed or performed in visit on 07/26/24   Streptococcus A Rapid Screen w/Reflex to PCR - Clinic Collect     Status: Normal    Specimen: Throat; Swab   Result Value Ref Range    Group A Strep antigen Negative Negative     PLAN:   See orders in epic.   Symptomatic treat with gargles, lozenges, and OTC analgesic as needed. Follow-up with primary clinic if not improving.  Also suggested to do covid test  Discussed with patient there was no obvious infection noted in the tooth area so we will hold on antibiotic for the timing.  Reviewed with patient about using toothpaste for sensitive tooth such as Sensodyne to help with symptoms and follow-up with dentist if the pain continues.      SUBJECTIVE:  Bandar Bhatt is a 29 year old female with a chief complaint of sore throat.  Onset of symptoms was 1 day(s) ago.    Course of illness: sudden onset.  Severity mild  Current and Associated symptoms: post nasal drip, right upper molar tooth ache   Treatment measures tried include Tylenol/Ibuprofen.  Predisposing factors include None.    No past medical history on file.  Current Outpatient Medications   Medication Sig Dispense Refill    cetirizine (ZYRTEC) 10 MG tablet Take 10 mg by mouth daily      fluticasone (FLONASE) 50 MCG/ACT nasal spray Spray 1 spray in nostril as needed      OLANZapine (ZYPREXA) 2.5 MG tablet Take 1 tablet by mouth daily at 2 pm      PARoxetine (PAXIL) 20 MG tablet Take 20 mg by mouth daily      propranolol  (INDERAL) 60 MG tablet Take 60 mg by mouth daily      escitalopram (LEXAPRO) 20 MG tablet Take 20 mg by mouth every morning (Patient not taking: Reported on 7/26/2024)      ibuprofen (ADVIL/MOTRIN) 600 MG tablet Take 1 tablet (600 mg) by mouth every 6 hours as needed for moderate pain (Patient not taking: Reported on 7/26/2024) 30 tablet 1    lamoTRIgine (LAMICTAL) 25 MG tablet 100 mg (Patient not taking: Reported on 7/26/2024)      Lidocaine (LIDOCARE) 4 % Patch Place 1 patch onto the skin every 24 hours To prevent lidocaine toxicity, patient should be patch free for 12 hrs daily. (Patient not taking: Reported on 7/26/2024) 5 patch 0    prazosin (MINIPRESS) 5 MG capsule 5 mg (Patient not taking: Reported on 7/26/2024)      promethazine (PHENERGAN) 25 MG tablet Take 1 tablet (25 mg) by mouth every 6 hours as needed for nausea (Patient not taking: Reported on 7/26/2024) 12 tablet 0    QUEtiapine (SEROQUEL) 100 MG tablet TAKE 1 TABLET BY MOUTH EVERY DAY AT BEDTIME. MAY TAKE 1.5 OR 2 TABLET IF 1 IS NOT SUFFICIENT (Patient not taking: Reported on 7/26/2024)       Social History     Tobacco Use    Smoking status: Not on file    Smokeless tobacco: Not on file   Substance Use Topics    Alcohol use: Not on file       ROS:  Review of systems negative except as stated above.    OBJECTIVE:   /77 (BP Location: Left arm, Patient Position: Sitting, Cuff Size: Adult Large)   Pulse 72   Temp 98.1  F (36.7  C) (Oral)   Resp 18   Wt 142 kg (313 lb)   SpO2 98%   BMI 46.22 kg/m    GENERAL APPEARANCE: healthy, alert and no distress  EYES: EOMI,  PERRL, conjunctiva clear  HENT: ear canals and TM mild congestion noted.  Nose normal.  Pharynx mild  erythema  noted.  Mouth -right upper molar noted to be chipped there was no gum swelling or redness noted  NECK: supple, non-tender to palpation, no adenopathy noted  RESP: lungs clear to auscultation - no rales, rhonchi or wheezes  CV: regular rates and rhythm, normal S1 S2, no  murmur noted  PSYCH: mentation appears normal    Yareli Miner MD

## 2024-09-24 ENCOUNTER — OFFICE VISIT (OUTPATIENT)
Dept: URGENT CARE | Facility: URGENT CARE | Age: 29
End: 2024-09-24
Payer: COMMERCIAL

## 2024-09-24 VITALS
WEIGHT: 293 LBS | OXYGEN SATURATION: 96 % | TEMPERATURE: 97.8 F | SYSTOLIC BLOOD PRESSURE: 115 MMHG | BODY MASS INDEX: 45.63 KG/M2 | DIASTOLIC BLOOD PRESSURE: 76 MMHG | HEART RATE: 76 BPM

## 2024-09-24 DIAGNOSIS — R07.0 THROAT PAIN: ICD-10-CM

## 2024-09-24 DIAGNOSIS — J06.9 VIRAL URI WITH COUGH: ICD-10-CM

## 2024-09-24 DIAGNOSIS — H66.001 NON-RECURRENT ACUTE SUPPURATIVE OTITIS MEDIA OF RIGHT EAR WITHOUT SPONTANEOUS RUPTURE OF TYMPANIC MEMBRANE: Primary | ICD-10-CM

## 2024-09-24 LAB
DEPRECATED S PYO AG THROAT QL EIA: NEGATIVE
GROUP A STREP BY PCR: NOT DETECTED
SARS-COV-2 RNA RESP QL NAA+PROBE: NEGATIVE

## 2024-09-24 PROCEDURE — 87635 SARS-COV-2 COVID-19 AMP PRB: CPT | Performed by: NURSE PRACTITIONER

## 2024-09-24 PROCEDURE — 99203 OFFICE O/P NEW LOW 30 MIN: CPT | Performed by: NURSE PRACTITIONER

## 2024-09-24 PROCEDURE — 87651 STREP A DNA AMP PROBE: CPT | Performed by: NURSE PRACTITIONER

## 2024-09-24 RX ORDER — OLANZAPINE 5 MG/1
TABLET ORAL
COMMUNITY
Start: 2024-09-10

## 2024-09-24 RX ORDER — OMEPRAZOLE 40 MG/1
1 CAPSULE, DELAYED RELEASE ORAL DAILY
COMMUNITY
Start: 2024-09-09

## 2024-09-24 RX ORDER — LEVONORGESTREL 52 MG/1
INTRAUTERINE DEVICE INTRAUTERINE
COMMUNITY
Start: 2023-09-05

## 2024-09-24 RX ORDER — PROMETHAZINE HYDROCHLORIDE 12.5 MG/1
12.5 TABLET ORAL EVERY 6 HOURS
COMMUNITY
Start: 2024-07-03

## 2024-09-24 RX ORDER — ZOLPIDEM TARTRATE 6.25 MG/1
TABLET, FILM COATED, EXTENDED RELEASE ORAL
COMMUNITY
Start: 2024-06-17

## 2024-09-24 NOTE — PROGRESS NOTES
ICD-10-CM    1. Non-recurrent acute suppurative otitis media of right ear without spontaneous rupture of tympanic membrane  H66.001 amoxicillin-clavulanate (AUGMENTIN) 875-125 MG tablet      2. Throat pain  R07.0 Streptococcus A Rapid Screen w/Reflex to PCR - Clinic Collect     Symptomatic COVID-19 Virus (Coronavirus) by PCR Nose     Group A Streptococcus PCR Throat Swab      3. Viral URI with cough  J06.9       Antibiotics for ear infection. Rest.  Fluids.  Delsym for cough suppression at night.  Mucinex as desired during the day.  Tylenol or ibuprofen as needed for fever or pain.  Recheck in 10 days if symptoms have not improved, sooner if they worsen.  Decongestant as needed.    Red flag warning signs and when to go to the emergency room discussed.  Reviewed potential adverse reactions to medications.    Labs:  Results for orders placed or performed in visit on 09/24/24 (from the past 24 hour(s))   Streptococcus A Rapid Screen w/Reflex to PCR - Clinic Collect    Specimen: Throat; Swab   Result Value Ref Range    Group A Strep antigen Negative Negative       SUBJECTIVE:   Bandar Bhatt is a 29 year old female presenting with a chief complaint of   Chief Complaint   Patient presents with    URI     Pt states starting 3 days ago she has had sore throat stuffy nose  then cough and headache. Diarrhea just started today and chest pain just started last night and it is painfull when cough and deep breaths.HX OF PNEUMONIA AND BRONCHITIS   .    Review of systems is negative except for as noted in the HPI.    OBJECTIVE  /76   Pulse 76   Temp 97.8  F (36.6  C) (Tympanic)   Wt 140.2 kg (309 lb)   SpO2 96%   BMI 45.63 kg/m        GENERAL: Alert, mild distress  SKIN: skin is clear, no rash or abnormal pigmentation  HEAD: The head is normocephalic.   EYES: The eyes are normal. The conjunctivae and cornea normal.   NECK: The neck is supple and thyroid is normal, no masses; LYMPH NODES: No adenopathy  HENT:  Right tympanic membrane is red and bulging, left tympanic membrane appears normal, bilateral canals are normal, mild pharyngeal erythema  LUNGS: The lung fields are clear to auscultation, no rales, rhonchi, wheezing or retractions  CV: Rhythm is regular. S1 and S2 are normal. No murmurs.  EXTREMITIES: Symmetric extremities no deformities    BENNY Ferreira, CNP  [facility] Provider    The use of Dragon/Nur[employer] dictation services may have been used to construct the content in this note; any grammatical or spelling errors are non-intentional. Please contact the author of this note directly if you are in need of any clarification.

## 2024-09-25 ENCOUNTER — TELEPHONE (OUTPATIENT)
Dept: URGENT CARE | Facility: URGENT CARE | Age: 29
End: 2024-09-25
Payer: COMMERCIAL

## 2024-09-25 NOTE — TELEPHONE ENCOUNTER
CALLED PATIENT BACK AND LET HER KNOW THE LETTER WAS CREATED AND SHE CAN  AT .  Darlin Park MA on 9/25/2024 at 1:30 PM

## 2024-09-25 NOTE — LETTER
September 25, 2024      Bandar Bhatt  [address]  [facility] [address]        To Whom It May Concern:    Bandar Bhatt  was seen on 09/24/2024.  Please excuse her  until 09/27/2024 due to illness.        Sincerely,        MINI ALEGRIA, CNP

## 2024-09-25 NOTE — TELEPHONE ENCOUNTER
Test Results    Contacts       Contact Date/Time Type Contact Phone/Fax    09/25/2024 11:43 AM CDT Phone (Incoming) Bandar Bhatt (Self) [phone] (H)            Who ordered the test:  GILMA Freeman    Type of test: Covid    Date of test:  9/24/24    Where was the test performed:  [facility]    What are your questions/concerns?:  patient would like a call back about the results and needs a work excuse note.     Okay to leave a detailed message?: Yes at Cell number on file:    Telephone Information:   Mobile [phone]

## 2024-09-25 NOTE — TELEPHONE ENCOUNTER
Called patient back she is wanting a note to excuse her for work from yesterday and today and tomorrow. I told her I will forward message to provider. She also wanted her covid result I did let her know it was negative.  Darlin Park MA on 9/25/2024 at 1:03 PM